# Patient Record
Sex: FEMALE | Race: WHITE | NOT HISPANIC OR LATINO
[De-identification: names, ages, dates, MRNs, and addresses within clinical notes are randomized per-mention and may not be internally consistent; named-entity substitution may affect disease eponyms.]

---

## 2017-02-17 ENCOUNTER — NON-APPOINTMENT (OUTPATIENT)
Age: 62
End: 2017-02-17

## 2017-02-17 ENCOUNTER — APPOINTMENT (OUTPATIENT)
Dept: CARDIOLOGY | Facility: CLINIC | Age: 62
End: 2017-02-17

## 2017-02-17 VITALS
HEART RATE: 70 BPM | WEIGHT: 128 LBS | BODY MASS INDEX: 21.85 KG/M2 | HEIGHT: 64 IN | DIASTOLIC BLOOD PRESSURE: 81 MMHG | SYSTOLIC BLOOD PRESSURE: 132 MMHG | OXYGEN SATURATION: 95 %

## 2017-02-17 RX ORDER — MULTIVIT-MIN/FOLIC/VIT K/LYCOP 400-300MCG
25 MCG TABLET ORAL DAILY
Qty: 90 | Refills: 1 | Status: ACTIVE | COMMUNITY

## 2017-05-08 ENCOUNTER — MEDICATION RENEWAL (OUTPATIENT)
Age: 62
End: 2017-05-08

## 2017-08-25 ENCOUNTER — APPOINTMENT (OUTPATIENT)
Dept: CARDIOLOGY | Facility: CLINIC | Age: 62
End: 2017-08-25
Payer: MEDICARE

## 2017-08-25 ENCOUNTER — NON-APPOINTMENT (OUTPATIENT)
Age: 62
End: 2017-08-25

## 2017-08-25 VITALS
BODY MASS INDEX: 22.36 KG/M2 | HEIGHT: 64 IN | WEIGHT: 131 LBS | SYSTOLIC BLOOD PRESSURE: 106 MMHG | HEART RATE: 69 BPM | OXYGEN SATURATION: 100 % | DIASTOLIC BLOOD PRESSURE: 70 MMHG

## 2017-08-25 PROCEDURE — 99214 OFFICE O/P EST MOD 30 MIN: CPT | Mod: 25

## 2017-08-25 PROCEDURE — 93000 ELECTROCARDIOGRAM COMPLETE: CPT

## 2017-10-25 ENCOUNTER — APPOINTMENT (OUTPATIENT)
Dept: CARDIOLOGY | Facility: CLINIC | Age: 62
End: 2017-10-25
Payer: MEDICARE

## 2017-10-25 PROCEDURE — 93306 TTE W/DOPPLER COMPLETE: CPT

## 2018-02-23 ENCOUNTER — APPOINTMENT (OUTPATIENT)
Dept: CARDIOLOGY | Facility: CLINIC | Age: 63
End: 2018-02-23
Payer: MEDICARE

## 2018-02-23 ENCOUNTER — NON-APPOINTMENT (OUTPATIENT)
Age: 63
End: 2018-02-23

## 2018-02-23 VITALS — HEIGHT: 64 IN | WEIGHT: 137 LBS | BODY MASS INDEX: 23.39 KG/M2

## 2018-02-23 VITALS — DIASTOLIC BLOOD PRESSURE: 80 MMHG | HEART RATE: 68 BPM | SYSTOLIC BLOOD PRESSURE: 124 MMHG | OXYGEN SATURATION: 97 %

## 2018-02-23 PROCEDURE — 93000 ELECTROCARDIOGRAM COMPLETE: CPT

## 2018-02-23 PROCEDURE — 99214 OFFICE O/P EST MOD 30 MIN: CPT | Mod: 25

## 2018-02-23 RX ORDER — PROPRANOLOL HYDROCHLORIDE 60 MG/1
60 CAPSULE, EXTENDED RELEASE ORAL
Qty: 1 | Refills: 1 | Status: DISCONTINUED | COMMUNITY
Start: 2018-02-23 | End: 2018-02-23

## 2018-02-23 RX ORDER — LOSARTAN POTASSIUM 50 MG/1
50 TABLET, FILM COATED ORAL DAILY
Qty: 90 | Refills: 3 | Status: DISCONTINUED | COMMUNITY
End: 2018-02-23

## 2018-05-01 ENCOUNTER — MEDICATION RENEWAL (OUTPATIENT)
Age: 63
End: 2018-05-01

## 2018-06-22 ENCOUNTER — APPOINTMENT (OUTPATIENT)
Dept: CARDIOLOGY | Facility: CLINIC | Age: 63
End: 2018-06-22
Payer: MEDICARE

## 2018-06-22 ENCOUNTER — NON-APPOINTMENT (OUTPATIENT)
Age: 63
End: 2018-06-22

## 2018-06-22 VITALS
DIASTOLIC BLOOD PRESSURE: 75 MMHG | HEIGHT: 64 IN | WEIGHT: 142 LBS | OXYGEN SATURATION: 99 % | BODY MASS INDEX: 24.24 KG/M2 | SYSTOLIC BLOOD PRESSURE: 119 MMHG | HEART RATE: 54 BPM

## 2018-06-22 PROCEDURE — 93000 ELECTROCARDIOGRAM COMPLETE: CPT

## 2018-06-22 PROCEDURE — 99214 OFFICE O/P EST MOD 30 MIN: CPT | Mod: 25

## 2019-01-31 ENCOUNTER — NON-APPOINTMENT (OUTPATIENT)
Age: 64
End: 2019-01-31

## 2019-01-31 ENCOUNTER — APPOINTMENT (OUTPATIENT)
Dept: CARDIOLOGY | Facility: CLINIC | Age: 64
End: 2019-01-31
Payer: MEDICARE

## 2019-01-31 VITALS — OXYGEN SATURATION: 99 % | HEART RATE: 68 BPM | WEIGHT: 150 LBS | HEIGHT: 64 IN | BODY MASS INDEX: 25.61 KG/M2

## 2019-01-31 VITALS — DIASTOLIC BLOOD PRESSURE: 77 MMHG | SYSTOLIC BLOOD PRESSURE: 135 MMHG

## 2019-01-31 PROCEDURE — 93000 ELECTROCARDIOGRAM COMPLETE: CPT

## 2019-01-31 PROCEDURE — 99214 OFFICE O/P EST MOD 30 MIN: CPT | Mod: 25

## 2019-01-31 NOTE — HISTORY OF PRESENT ILLNESS
[FreeTextEntry1] : This is 61 Y/O fe,stephanie PMH: Mentally challenged, seizure disorder, HTN, HLD, who presents today in routine follow up accompanied with formal caregiver who offers no concerns or complaints. \par \par Patient claims to be doing well no CP/SOB/Palpitations/Dizziness\par \par Patient was  seen neurologist , who recommended to switch losartan to  inderal LA  for essential tremors \par however patient  she did not tolerate inderal LA in the past ,  her tremors less severe \par \par Patient blood work 5/24/18    LDL 77 TG 40 \par Holter monitor showed sinus rhythm , with vpcs , bigeminal pattern,\par \par

## 2019-01-31 NOTE — REASON FOR VISIT
[Follow-Up - Clinic] : a clinic follow-up of [Abnormal ECG] : an abnormal ECG [Hyperlipidemia] : hyperlipidemia [Hypertension] : hypertension [Medication Management] : Medication management [FreeTextEntry1] : murmur,

## 2019-01-31 NOTE — DISCUSSION/SUMMARY
[ECG Normal Variant] : ECG normal variant [Non-specific ECG Changes] : abnormal ECG [Hyperlipidemia] : hyperlipidemia [Deteriorating] : deteriorating [Diet Modification] : diet modification [Exercise] : exercise [Hypertension] : hypertension [Stable] : stable [Exercise Regimen] : an exercise regimen [Sodium Restriction] : sodium restriction [Patient] : the patient [Compensated] : compensated [None] : none [de-identified] : mild to moderate MR

## 2019-01-31 NOTE — PHYSICAL EXAM
[General Appearance - Well Developed] : well developed [Normal Conjunctiva] : the conjunctiva exhibited no abnormalities [Normal Oral Mucosa] : normal oral mucosa [Normal Jugular Venous A Waves Present] : normal jugular venous A waves present [Normal Jugular Venous V Waves Present] : normal jugular venous V waves present [Respiration, Rhythm And Depth] : normal respiratory rhythm and effort [Exaggerated Use Of Accessory Muscles For Inspiration] : no accessory muscle use [Auscultation Breath Sounds / Voice Sounds] : lungs were clear to auscultation bilaterally [Chest Palpation] : palpation of the chest revealed no abnormalities [Lungs Percussion] : the lungs were normal to percussion [Bowel Sounds] : normal bowel sounds [Abnormal Walk] : normal gait [Nail Clubbing] : no clubbing of the fingernails [] : no ischemic changes [FreeTextEntry1] : No LE Edema [Skin Color & Pigmentation] : normal skin color and pigmentation [Skin Turgor] : normal skin turgor [Oriented To Time, Place, And Person] : oriented to person, place, and time [Affect] : the affect was normal [5th Left ICS - MCL] : palpated at the 5th LICS in the midclavicular line [Normal] : normal [Normal Rate] : normal [Normal S1] : normal S1 [Normal S2] : normal S2 [I] : a grade 1 [2+] : right 2+ [Bruit] : no bruit heard [No Pitting Edema] : no pitting edema present

## 2019-01-31 NOTE — REVIEW OF SYSTEMS
[Fever] : no fever [Chills] : no chills [Seeing Double (Diplopia)] : no diplopia [Eye Pain] : no eye pain [Eyeglasses] : currently wearing eyeglasses [Earache] : no earache [Shortness Of Breath] : no shortness of breath [Dyspnea on exertion] : not dyspnea during exertion [Chest  Pressure] : no chest pressure [Chest Pain] : no chest pain [Lower Ext Edema] : no extremity edema [Leg Claudication] : no intermittent leg claudication [Palpitations] : no palpitations [Cough] : no cough [Abdominal Pain] : no abdominal pain [Muscle Cramps] : no muscle cramps [see HPI] : see HPI [Negative] : Endocrine

## 2019-07-18 ENCOUNTER — APPOINTMENT (OUTPATIENT)
Dept: CARDIOLOGY | Facility: CLINIC | Age: 64
End: 2019-07-18
Payer: MEDICARE

## 2019-07-18 ENCOUNTER — NON-APPOINTMENT (OUTPATIENT)
Age: 64
End: 2019-07-18

## 2019-07-18 VITALS
HEART RATE: 55 BPM | HEIGHT: 64 IN | OXYGEN SATURATION: 99 % | BODY MASS INDEX: 24.41 KG/M2 | DIASTOLIC BLOOD PRESSURE: 56 MMHG | SYSTOLIC BLOOD PRESSURE: 110 MMHG | WEIGHT: 143 LBS

## 2019-07-18 PROCEDURE — 99214 OFFICE O/P EST MOD 30 MIN: CPT | Mod: 25

## 2019-07-18 PROCEDURE — 93000 ELECTROCARDIOGRAM COMPLETE: CPT

## 2019-07-18 NOTE — HISTORY OF PRESENT ILLNESS
[FreeTextEntry1] : This is 62 Y/O female PMH: Mentally challenged, seizure disorder, HTN, HLD, who presents today in routine follow up accompanied with formal caregiver who offers no concerns or complaints. \par \par Patient claims to be doing well no CP/SOB/Palpitations/Dizziness\par \par Patient was seen by neurologist who recommended switching losartan to  inderal LA  for essential tremors however,  patient was previously intolerant to this. Claims tremors she no longer has tremors.  \par

## 2019-07-18 NOTE — DISCUSSION/SUMMARY
[FreeTextEntry1] : MR: Mild-moderate by Echo will monitor with yearly surveillance studies.  Echo ordered today \par \par HTN: Controlled \par \par OV 6 months

## 2020-01-23 ENCOUNTER — APPOINTMENT (OUTPATIENT)
Dept: CARDIOLOGY | Facility: CLINIC | Age: 65
End: 2020-01-23
Payer: MEDICARE

## 2020-01-23 PROCEDURE — 93306 TTE W/DOPPLER COMPLETE: CPT

## 2020-01-24 ENCOUNTER — APPOINTMENT (OUTPATIENT)
Dept: CARDIOLOGY | Facility: CLINIC | Age: 65
End: 2020-01-24
Payer: MEDICARE

## 2020-01-24 ENCOUNTER — NON-APPOINTMENT (OUTPATIENT)
Age: 65
End: 2020-01-24

## 2020-01-24 VITALS
HEIGHT: 64 IN | SYSTOLIC BLOOD PRESSURE: 103 MMHG | BODY MASS INDEX: 23.56 KG/M2 | OXYGEN SATURATION: 98 % | DIASTOLIC BLOOD PRESSURE: 68 MMHG | WEIGHT: 138 LBS | HEART RATE: 63 BPM

## 2020-01-24 PROCEDURE — 99214 OFFICE O/P EST MOD 30 MIN: CPT

## 2020-01-24 PROCEDURE — 93000 ELECTROCARDIOGRAM COMPLETE: CPT

## 2020-01-24 NOTE — PHYSICAL EXAM
[General Appearance - Well Developed] : well developed [Normal Conjunctiva] : the conjunctiva exhibited no abnormalities [Normal Oral Mucosa] : normal oral mucosa [Normal Jugular Venous A Waves Present] : normal jugular venous A waves present [Normal Jugular Venous V Waves Present] : normal jugular venous V waves present [Respiration, Rhythm And Depth] : normal respiratory rhythm and effort [Exaggerated Use Of Accessory Muscles For Inspiration] : no accessory muscle use [Auscultation Breath Sounds / Voice Sounds] : lungs were clear to auscultation bilaterally [Chest Palpation] : palpation of the chest revealed no abnormalities [Lungs Percussion] : the lungs were normal to percussion [Bowel Sounds] : normal bowel sounds [Abnormal Walk] : normal gait [Nail Clubbing] : no clubbing of the fingernails [] : no ischemic changes [Skin Color & Pigmentation] : normal skin color and pigmentation [Skin Turgor] : normal skin turgor [Oriented To Time, Place, And Person] : oriented to person, place, and time [Affect] : the affect was normal [5th Left ICS - MCL] : palpated at the 5th LICS in the midclavicular line [Normal] : normal [Normal Rate] : normal [Normal S1] : normal S1 [Normal S2] : normal S2 [I] : a grade 1 [2+] : right 2+ [No Pitting Edema] : no pitting edema present [FreeTextEntry1] : No LE Edema [Bruit] : no bruit heard

## 2020-01-24 NOTE — HISTORY OF PRESENT ILLNESS
[FreeTextEntry1] : This is 63 Y/O fe,stephanie PMH: Mentally challenged, seizure disorder, HTN, HLD, who presents today for cardiac  follow up accompanied with formal caregiver who offers no concerns or complaints. \par \par Patient claims to be doing well no CP/SOB/Palpitations/Dizziness\par \par Patient was  seen neurologist , who recommended to switch losartan to  inderal LA  for essential tremors \par however patient  she did not tolerate inderal LA in the past , Claims to have no more tremors. \par \par Patient had recent blood work will get faxed as per caregiver.  \par \par

## 2020-01-24 NOTE — DISCUSSION/SUMMARY
[ECG Normal Variant] : ECG normal variant [Non-specific ECG Changes] : abnormal ECG [Hyperlipidemia] : hyperlipidemia [Deteriorating] : deteriorating [Diet Modification] : diet modification [Exercise] : exercise [Hypertension] : hypertension [Stable] : stable [Exercise Regimen] : an exercise regimen [Sodium Restriction] : sodium restriction [Patient] : the patient [Compensated] : compensated [None] : none [de-identified] : mild to moderate MR

## 2020-01-24 NOTE — REVIEW OF SYSTEMS
[Eyeglasses] : currently wearing eyeglasses [see HPI] : see HPI [Negative] : Endocrine [Fever] : no fever [Chills] : no chills [Seeing Double (Diplopia)] : no diplopia [Eye Pain] : no eye pain [Earache] : no earache [Shortness Of Breath] : no shortness of breath [Chest Pain] : no chest pain [Chest  Pressure] : no chest pressure [Dyspnea on exertion] : not dyspnea during exertion [Lower Ext Edema] : no extremity edema [Palpitations] : no palpitations [Leg Claudication] : no intermittent leg claudication [Abdominal Pain] : no abdominal pain [Cough] : no cough [Muscle Cramps] : no muscle cramps

## 2020-01-24 NOTE — CARDIOLOGY SUMMARY
[No Ischemia] : no Ischemia [___] : [unfilled] [LVEF ___%] : LVEF [unfilled]% [Mild] : mild LV dysfunction [None] : no pulmonary hypertension [Enlarged] : enlarged LA size

## 2020-09-04 ENCOUNTER — APPOINTMENT (OUTPATIENT)
Dept: CARDIOLOGY | Facility: CLINIC | Age: 65
End: 2020-09-04

## 2020-09-25 ENCOUNTER — NON-APPOINTMENT (OUTPATIENT)
Age: 65
End: 2020-09-25

## 2020-09-25 ENCOUNTER — APPOINTMENT (OUTPATIENT)
Dept: CARDIOLOGY | Facility: CLINIC | Age: 65
End: 2020-09-25
Payer: MEDICARE

## 2020-09-25 VITALS
OXYGEN SATURATION: 98 % | SYSTOLIC BLOOD PRESSURE: 126 MMHG | DIASTOLIC BLOOD PRESSURE: 73 MMHG | HEART RATE: 66 BPM | BODY MASS INDEX: 23.05 KG/M2 | HEIGHT: 64 IN | WEIGHT: 135 LBS

## 2020-09-25 PROCEDURE — 93000 ELECTROCARDIOGRAM COMPLETE: CPT

## 2020-09-25 PROCEDURE — 99214 OFFICE O/P EST MOD 30 MIN: CPT

## 2020-09-25 NOTE — REVIEW OF SYSTEMS
[Eyeglasses] : currently wearing eyeglasses [see HPI] : see HPI [Negative] : Endocrine [Fever] : no fever [Chills] : no chills [Seeing Double (Diplopia)] : no diplopia [Eye Pain] : no eye pain [Earache] : no earache [Mouth Sores] : no mouth sores [Shortness Of Breath] : no shortness of breath [Dyspnea on exertion] : not dyspnea during exertion [Chest  Pressure] : no chest pressure [Chest Pain] : no chest pain [Lower Ext Edema] : no extremity edema [Leg Claudication] : no intermittent leg claudication [Palpitations] : no palpitations [Cough] : no cough [Abdominal Pain] : no abdominal pain [Muscle Cramps] : no muscle cramps

## 2020-09-25 NOTE — CARDIOLOGY SUMMARY
[No Ischemia] : no Ischemia [___] : [unfilled] [LVEF ___%] : LVEF [unfilled]% [None] : no pulmonary hypertension [Enlarged] : enlarged LA size [Mild] : mild mitral regurgitation

## 2020-09-25 NOTE — DISCUSSION/SUMMARY
[ECG Normal Variant] : ECG normal variant [Non-specific ECG Changes] : abnormal ECG [Hyperlipidemia] : hyperlipidemia [Deteriorating] : deteriorating [Diet Modification] : diet modification [Exercise] : exercise [Hypertension] : hypertension [Stable] : stable [Exercise Regimen] : an exercise regimen [Sodium Restriction] : sodium restriction [Patient] : the patient [Compensated] : compensated [None] : none [de-identified] : mild to moderate MR

## 2020-09-25 NOTE — PHYSICAL EXAM
[General Appearance - Well Developed] : well developed [Normal Conjunctiva] : the conjunctiva exhibited no abnormalities [Normal Oral Mucosa] : normal oral mucosa [Normal Jugular Venous A Waves Present] : normal jugular venous A waves present [Normal Jugular Venous V Waves Present] : normal jugular venous V waves present [Respiration, Rhythm And Depth] : normal respiratory rhythm and effort [Exaggerated Use Of Accessory Muscles For Inspiration] : no accessory muscle use [Auscultation Breath Sounds / Voice Sounds] : lungs were clear to auscultation bilaterally [Chest Palpation] : palpation of the chest revealed no abnormalities [Lungs Percussion] : the lungs were normal to percussion [Bowel Sounds] : normal bowel sounds [Abnormal Walk] : normal gait [Nail Clubbing] : no clubbing of the fingernails [] : no ischemic changes [Skin Color & Pigmentation] : normal skin color and pigmentation [Skin Turgor] : normal skin turgor [Oriented To Time, Place, And Person] : oriented to person, place, and time [Affect] : the affect was normal [5th Left ICS - MCL] : palpated at the 5th LICS in the midclavicular line [Normal] : normal [Normal Rate] : normal [Normal S1] : normal S1 [Normal S2] : normal S2 [II] : a grade 2 [2+] : right 2+ [No Pitting Edema] : no pitting edema present [FreeTextEntry1] : No LE Edema [Bruit] : no bruit heard

## 2020-09-25 NOTE — HISTORY OF PRESENT ILLNESS
[FreeTextEntry1] : Patient with hx of Mentally challenged, seizure disorder, HTN, HLD, who presents today for cardiac  follow up accompanied with formal caregiver who offers no complains  \par \par Patient claims to be doing well no CP/SOB/Palpitations/Dizziness\par \par Patient was  seen neurologist , who recommended to switch losartan to  inderal LA  for essential tremors \par however patient  she did not tolerate inderal LA in the past , Claims to have no more tremors. \par \par Patient had recent blood work will get faxed as per caregiver.  \par \par

## 2020-09-28 LAB
ALBUMIN SERPL ELPH-MCNC: 4.6 G/DL
ALP BLD-CCNC: 77 U/L
ALT SERPL-CCNC: 11 U/L
ANION GAP SERPL CALC-SCNC: 15 MMOL/L
AST SERPL-CCNC: 15 U/L
BASOPHILS # BLD AUTO: 0.05 K/UL
BASOPHILS NFR BLD AUTO: 0.8 %
BILIRUB SERPL-MCNC: 0.3 MG/DL
BUN SERPL-MCNC: 12 MG/DL
CALCIUM SERPL-MCNC: 9.7 MG/DL
CHLORIDE SERPL-SCNC: 97 MMOL/L
CHOLEST SERPL-MCNC: 194 MG/DL
CHOLEST/HDLC SERPL: 1.8 RATIO
CO2 SERPL-SCNC: 26 MMOL/L
CREAT SERPL-MCNC: 0.79 MG/DL
EOSINOPHIL # BLD AUTO: 0.05 K/UL
EOSINOPHIL NFR BLD AUTO: 0.8 %
ESTIMATED AVERAGE GLUCOSE: 114 MG/DL
GLUCOSE SERPL-MCNC: 96 MG/DL
HBA1C MFR BLD HPLC: 5.6 %
HCT VFR BLD CALC: 38.7 %
HDLC SERPL-MCNC: 107 MG/DL
HGB BLD-MCNC: 12.6 G/DL
IMM GRANULOCYTES NFR BLD AUTO: 0.3 %
LDLC SERPL CALC-MCNC: 78 MG/DL
LYMPHOCYTES # BLD AUTO: 2.35 K/UL
LYMPHOCYTES NFR BLD AUTO: 36.8 %
MAN DIFF?: NORMAL
MCHC RBC-ENTMCNC: 30.4 PG
MCHC RBC-ENTMCNC: 32.6 GM/DL
MCV RBC AUTO: 93.5 FL
MONOCYTES # BLD AUTO: 0.56 K/UL
MONOCYTES NFR BLD AUTO: 8.8 %
NEUTROPHILS # BLD AUTO: 3.36 K/UL
NEUTROPHILS NFR BLD AUTO: 52.5 %
PLATELET # BLD AUTO: 233 K/UL
POTASSIUM SERPL-SCNC: 4.6 MMOL/L
PROT SERPL-MCNC: 6.8 G/DL
RBC # BLD: 4.14 M/UL
RBC # FLD: 13.3 %
SODIUM SERPL-SCNC: 137 MMOL/L
TRIGL SERPL-MCNC: 47 MG/DL
TSH SERPL-ACNC: 1.48 UIU/ML
WBC # FLD AUTO: 6.39 K/UL

## 2021-04-09 ENCOUNTER — APPOINTMENT (OUTPATIENT)
Dept: CARDIOLOGY | Facility: CLINIC | Age: 66
End: 2021-04-09
Payer: MEDICARE

## 2021-04-09 ENCOUNTER — NON-APPOINTMENT (OUTPATIENT)
Age: 66
End: 2021-04-09

## 2021-04-09 VITALS
HEIGHT: 64 IN | WEIGHT: 136 LBS | BODY MASS INDEX: 23.22 KG/M2 | OXYGEN SATURATION: 99 % | SYSTOLIC BLOOD PRESSURE: 124 MMHG | DIASTOLIC BLOOD PRESSURE: 76 MMHG | TEMPERATURE: 97.7 F | HEART RATE: 68 BPM

## 2021-04-09 PROCEDURE — 99214 OFFICE O/P EST MOD 30 MIN: CPT

## 2021-04-09 PROCEDURE — 93000 ELECTROCARDIOGRAM COMPLETE: CPT

## 2021-04-09 NOTE — REVIEW OF SYSTEMS
[Fever] : no fever [Chills] : no chills [Seeing Double (Diplopia)] : no diplopia [Eye Pain] : no eye pain [Eyeglasses] : currently wearing eyeglasses [Earache] : no earache [Mouth Sores] : no mouth sores [Shortness Of Breath] : no shortness of breath [Dyspnea on exertion] : not dyspnea during exertion [Chest  Pressure] : no chest pressure [Chest Pain] : no chest pain [Lower Ext Edema] : no extremity edema [Leg Claudication] : no intermittent leg claudication [Palpitations] : no palpitations [Cough] : no cough [Abdominal Pain] : no abdominal pain [Muscle Cramps] : no muscle cramps [see HPI] : see HPI [Negative] : Endocrine

## 2021-04-09 NOTE — HISTORY OF PRESENT ILLNESS
[FreeTextEntry1] : Patient with hx of Mentally challenged, seizure disorder, HTN, HLD, who presents today for cardiac  follow up accompanied with formal caregiver who offers no complains  \par \par Patient claims to be doing well no CP/SOB/Palpitations/Dizziness\par \par Patient was  seen neurologist , who recommended to switch losartan to  inderal LA  for essential tremors \par however patient  she did not tolerate inderal LA in the past , Claims to have no more tremors. \par \par Patient had recent blood work showed mild elevated cholesterol , with very high   her blood pressure is controlled \par \par Patient echo showed mild dilated ascending aorta 4 cm stable \par

## 2021-04-09 NOTE — PHYSICAL EXAM
[General Appearance - Well Developed] : well developed [Normal Conjunctiva] : the conjunctiva exhibited no abnormalities [Normal Oral Mucosa] : normal oral mucosa [Normal Jugular Venous A Waves Present] : normal jugular venous A waves present [Normal Jugular Venous V Waves Present] : normal jugular venous V waves present [Respiration, Rhythm And Depth] : normal respiratory rhythm and effort [Exaggerated Use Of Accessory Muscles For Inspiration] : no accessory muscle use [Auscultation Breath Sounds / Voice Sounds] : lungs were clear to auscultation bilaterally [Chest Palpation] : palpation of the chest revealed no abnormalities [Lungs Percussion] : the lungs were normal to percussion [Bowel Sounds] : normal bowel sounds [Abnormal Walk] : normal gait [Nail Clubbing] : no clubbing of the fingernails [] : no ischemic changes [FreeTextEntry1] : No LE Edema [Skin Color & Pigmentation] : normal skin color and pigmentation [Skin Turgor] : normal skin turgor [Oriented To Time, Place, And Person] : oriented to person, place, and time [Affect] : the affect was normal [5th Left ICS - MCL] : palpated at the 5th LICS in the midclavicular line [Normal] : normal [Normal Rate] : normal [Normal S1] : normal S1 [Normal S2] : normal S2 [II] : a grade 2 [2+] : right 2+ [Bruit] : no bruit heard [No Pitting Edema] : no pitting edema present

## 2021-04-09 NOTE — DISCUSSION/SUMMARY
[ECG Normal Variant] : ECG normal variant [Non-specific ECG Changes] : abnormal ECG [Hyperlipidemia] : hyperlipidemia [Deteriorating] : deteriorating [Diet Modification] : diet modification [Exercise] : exercise [Hypertension] : hypertension [Stable] : stable [Exercise Regimen] : an exercise regimen [Sodium Restriction] : sodium restriction [Patient] : the patient [Compensated] : compensated [None] : none [de-identified] : mild to moderate MR

## 2021-10-13 ENCOUNTER — APPOINTMENT (OUTPATIENT)
Dept: OTOLARYNGOLOGY | Facility: CLINIC | Age: 66
End: 2021-10-13

## 2021-10-14 ENCOUNTER — APPOINTMENT (OUTPATIENT)
Dept: CARDIOLOGY | Facility: CLINIC | Age: 66
End: 2021-10-14
Payer: MEDICARE

## 2021-10-14 ENCOUNTER — NON-APPOINTMENT (OUTPATIENT)
Age: 66
End: 2021-10-14

## 2021-10-14 VITALS
OXYGEN SATURATION: 98 % | HEART RATE: 63 BPM | HEIGHT: 64 IN | WEIGHT: 132 LBS | BODY MASS INDEX: 22.53 KG/M2 | DIASTOLIC BLOOD PRESSURE: 76 MMHG | SYSTOLIC BLOOD PRESSURE: 120 MMHG

## 2021-10-14 PROCEDURE — 99214 OFFICE O/P EST MOD 30 MIN: CPT

## 2021-10-14 PROCEDURE — 93000 ELECTROCARDIOGRAM COMPLETE: CPT

## 2021-10-14 NOTE — CARDIOLOGY SUMMARY
[de-identified] :  10/14/21 normal sinus rhythm early repolarization  T inversion v1v2   similar to prior ekg  [de-identified] : 1/23/20   EF 60% mild DD , mild dilated ascending aorta 4.0 cm  mild MR

## 2021-10-14 NOTE — HISTORY OF PRESENT ILLNESS
[FreeTextEntry1] : Patient with hx of Mentally challenged, seizure disorder, HTN, HLD, who presents today for cardiac  follow up accompanied with formal caregiver who offers no complains  ,  patient was  hospitalized  last month seizure /vertigo like symptoms  improved with meclizine , \par \par Patient claims to be doing well no CP/SOB/Palpitations/Dizziness\par \par Patient was  seen neurologist , who recommended to switch losartan to  inderal LA  for essential tremors \par however patient  she did not tolerate inderal LA in the past , Claims to have no more tremors. \par \par Patient had recent blood work showed mild elevated cholesterol , with very high   her blood pressure is controlled \par \par Patient echo showed mild dilated ascending aorta 4 cm stable \par

## 2021-10-14 NOTE — ASSESSMENT
[FreeTextEntry1] : Patient with above hx \par \par Abnormal EKG possible normal variant EKG  normal ventricular function without exertional chest pain , continue to monitor \par \par cardiac murmur  possible due to aortic sclerosis , mild MR   \par \par Mild dilated Ascending aorta , blood pressure control ,monitor aortic size with periodic echo   echo prior to next visit \par \par HTN  controlled   continue diet restrictions and medication\par \par dyslipidemia   elevated TC low HDL  continue follow diet restriction ,  \par \par follow up after 6 months

## 2021-10-14 NOTE — PHYSICAL EXAM
[Well Developed] : well developed [Well Nourished] : well nourished [No Acute Distress] : no acute distress [Normal Conjunctiva] : normal conjunctiva [Normal Venous Pressure] : normal venous pressure [No Carotid Bruit] : no carotid bruit [No Gallop] : no gallop [Normal Rate] : normal [Normal S1] : normal S1 [Normal S2] : normal S2 [II] : a grade 2 [No Pitting Edema] : no pitting edema present [2+] : left 2+ [No Abnormalities] : the abdominal aorta was not enlarged and no bruit was heard [Clear Lung Fields] : clear lung fields [Good Air Entry] : good air entry [No Respiratory Distress] : no respiratory distress  [Soft] : abdomen soft [Non Tender] : non-tender [Normal Bowel Sounds] : normal bowel sounds [Normal Gait] : normal gait [No Edema] : no edema [No Cyanosis] : no cyanosis [No Clubbing] : no clubbing [No Varicosities] : no varicosities [No Rash] : no rash [No Skin Lesions] : no skin lesions [Moves all extremities] : moves all extremities [No Focal Deficits] : no focal deficits [Normal Speech] : normal speech [Alert and Oriented] : alert and oriented [Normal memory] : normal memory [S3] : no S3 [S4] : no S4 [Right Carotid Bruit] : no bruit heard over the right carotid [Left Carotid Bruit] : no bruit heard over the left carotid [Right Femoral Bruit] : no bruit heard over the right femoral artery [Left Femoral Bruit] : no bruit heard over the left femoral artery

## 2021-10-15 ENCOUNTER — APPOINTMENT (OUTPATIENT)
Dept: CARDIOLOGY | Facility: CLINIC | Age: 66
End: 2021-10-15

## 2021-11-04 ENCOUNTER — APPOINTMENT (OUTPATIENT)
Dept: OTOLARYNGOLOGY | Facility: CLINIC | Age: 66
End: 2021-11-04
Payer: MEDICARE

## 2021-11-04 VITALS
HEART RATE: 68 BPM | WEIGHT: 132 LBS | SYSTOLIC BLOOD PRESSURE: 135 MMHG | TEMPERATURE: 97.6 F | BODY MASS INDEX: 22.53 KG/M2 | HEIGHT: 64 IN | DIASTOLIC BLOOD PRESSURE: 80 MMHG

## 2021-11-04 DIAGNOSIS — H90.3 SENSORINEURAL HEARING LOSS, BILATERAL: ICD-10-CM

## 2021-11-04 DIAGNOSIS — H81.09 MENIERE'S DISEASE, UNSPECIFIED EAR: ICD-10-CM

## 2021-11-04 PROCEDURE — 99204 OFFICE O/P NEW MOD 45 MIN: CPT

## 2021-11-04 NOTE — PHYSICAL EXAM
[Midline] : trachea located in midline position [Normal] : no rashes [Nystagmus] : ~T no ~M nystagmus was seen [Fukuda Step Test] : Fukuda Step Test was Negative [de-identified] : wnl

## 2021-11-04 NOTE — ASSESSMENT
[FreeTextEntry1] : Patient presents from group home with  complains she had a severe vertigo attack in August, was hospitalized for 2 days. She has vertigo for 2 years now. Gets an attack once a month \par \par f/o Vestibulopathy poss Meniere's Disease\par -Hearing Test performed to evaluate the extent of hearing loss and to explain pt's symptoms\par -ECOG/ABR ordered \par -Low salt diet handout given \par \par f/u prn and in 2 months

## 2021-11-12 ENCOUNTER — APPOINTMENT (OUTPATIENT)
Dept: CARDIOLOGY | Facility: CLINIC | Age: 66
End: 2021-11-12
Payer: MEDICARE

## 2021-11-12 PROCEDURE — 93306 TTE W/DOPPLER COMPLETE: CPT

## 2021-11-16 ENCOUNTER — NON-APPOINTMENT (OUTPATIENT)
Age: 66
End: 2021-11-16

## 2022-02-17 ENCOUNTER — APPOINTMENT (OUTPATIENT)
Dept: OTOLARYNGOLOGY | Facility: CLINIC | Age: 67
End: 2022-02-17

## 2022-04-07 ENCOUNTER — APPOINTMENT (OUTPATIENT)
Dept: CARDIOLOGY | Facility: CLINIC | Age: 67
End: 2022-04-07
Payer: MEDICARE

## 2022-04-07 ENCOUNTER — NON-APPOINTMENT (OUTPATIENT)
Age: 67
End: 2022-04-07

## 2022-04-07 VITALS
BODY MASS INDEX: 22.2 KG/M2 | HEIGHT: 64 IN | SYSTOLIC BLOOD PRESSURE: 132 MMHG | HEART RATE: 74 BPM | OXYGEN SATURATION: 99 % | WEIGHT: 130 LBS | DIASTOLIC BLOOD PRESSURE: 78 MMHG

## 2022-04-07 PROCEDURE — 99214 OFFICE O/P EST MOD 30 MIN: CPT

## 2022-04-07 PROCEDURE — 93000 ELECTROCARDIOGRAM COMPLETE: CPT

## 2022-04-07 NOTE — ASSESSMENT
[FreeTextEntry1] : Patient with above hx \par \par Abnormal EKG possible normal variant EKG  normal ventricular function without exertional chest pain , continue to monitor \par \par cardiac murmur  possible due to aortic sclerosis , mild MR   \par \par Mild dilated Ascending aorta  stable size , blood pressure control ,monitor aortic size with periodic echo  \par \par HTN  controlled   continue diet restrictions and medication losartan 50 mg po daily \par \par dyslipidemia   elevated TC low HDL  continue follow diet restriction ,  \par \par follow up after 6 months

## 2022-04-07 NOTE — CARDIOLOGY SUMMARY
[de-identified] : 4/7/22  normal sinus rhythm early repolarization  T inversion v1v2   similar to prior ekg  [de-identified] : 11/12/21  Mild LVH  EF 67% mild DD , mild dilated ascending aorta 3.8 cm  mild MR

## 2022-04-07 NOTE — HISTORY OF PRESENT ILLNESS
[FreeTextEntry1] : Patient with hx of Mentally challenged, seizure disorder, HTN, HLD, who presents today for cardiac  follow up accompanied with formal caregiver who offers no complains  ,\par \par Patient claims to be doing well no CP/SOB/Palpitations/Dizziness\par \par Patient was  seen neurologist , who recommended to switch losartan to  inderal LA  for essential tremors \par however patient  she did not tolerate inderal LA in the past , Claims to have no more tremors. \par \par Patient had recent blood work done march 2022  showed normal  cholesterol , with very high   her blood pressure is controlled  Hb a1c 5.7 \par \par Patient echo showed mild dilated ascending aorta 3.8  cm stable   patient blood pressure is controlled \par

## 2022-05-26 ENCOUNTER — APPOINTMENT (OUTPATIENT)
Dept: OTOLARYNGOLOGY | Facility: CLINIC | Age: 67
End: 2022-05-26
Payer: MEDICARE

## 2022-05-26 PROCEDURE — ZZZZZ: CPT

## 2022-05-26 PROCEDURE — 92584 ELECTROCOCHLEOGRAPHY: CPT

## 2022-05-26 PROCEDURE — 92653 AEP NEURODIAGNOSTIC I&R: CPT

## 2022-05-27 ENCOUNTER — NON-APPOINTMENT (OUTPATIENT)
Age: 67
End: 2022-05-27

## 2022-10-05 ENCOUNTER — RESULT CHARGE (OUTPATIENT)
Age: 67
End: 2022-10-05

## 2022-10-05 NOTE — REASON FOR VISIT
[Arrhythmia/ECG Abnorrmalities] : arrhythmia/ECG abnormalities [Hyperlipidemia] : hyperlipidemia [Hypertension] : hypertension [Formal Caregiver] : formal caregiver

## 2022-10-06 ENCOUNTER — NON-APPOINTMENT (OUTPATIENT)
Age: 67
End: 2022-10-06

## 2022-10-06 ENCOUNTER — APPOINTMENT (OUTPATIENT)
Dept: CARDIOLOGY | Facility: CLINIC | Age: 67
End: 2022-10-06

## 2022-10-06 VITALS
BODY MASS INDEX: 24.59 KG/M2 | HEART RATE: 67 BPM | SYSTOLIC BLOOD PRESSURE: 144 MMHG | OXYGEN SATURATION: 97 % | WEIGHT: 144 LBS | HEIGHT: 64 IN | DIASTOLIC BLOOD PRESSURE: 80 MMHG

## 2022-10-06 VITALS — DIASTOLIC BLOOD PRESSURE: 78 MMHG | SYSTOLIC BLOOD PRESSURE: 130 MMHG

## 2022-10-06 PROCEDURE — 93000 ELECTROCARDIOGRAM COMPLETE: CPT

## 2022-10-06 PROCEDURE — 99214 OFFICE O/P EST MOD 30 MIN: CPT

## 2022-10-06 RX ORDER — LEVETIRACETAM 500 MG/1
500 TABLET, FILM COATED ORAL TWICE DAILY
Refills: 0 | Status: ACTIVE | COMMUNITY
Start: 2022-10-06

## 2022-10-06 RX ORDER — LAMOTRIGINE 250 MG/1
250 TABLET, EXTENDED RELEASE ORAL DAILY
Refills: 0 | Status: ACTIVE | COMMUNITY
Start: 2022-10-06

## 2022-10-06 RX ORDER — MECLIZINE HYDROCHLORIDE 25 MG/1
25 TABLET ORAL 3 TIMES DAILY
Refills: 0 | Status: DISCONTINUED | COMMUNITY
End: 2022-10-06

## 2022-10-06 NOTE — PHYSICAL EXAM
[Well Developed] : well developed [Well Nourished] : well nourished [No Acute Distress] : no acute distress [Normal Conjunctiva] : normal conjunctiva [Normal Venous Pressure] : normal venous pressure [No Carotid Bruit] : no carotid bruit [No Gallop] : no gallop [Normal Rate] : normal [Normal S1] : normal S1 [Normal S2] : normal S2 [II] : a grade 2 [No Pitting Edema] : no pitting edema present [2+] : left 2+ [No Abnormalities] : the abdominal aorta was not enlarged and no bruit was heard [Clear Lung Fields] : clear lung fields [Good Air Entry] : good air entry [No Respiratory Distress] : no respiratory distress  [Soft] : abdomen soft [Non Tender] : non-tender [Normal Bowel Sounds] : normal bowel sounds [Normal Gait] : normal gait [No Edema] : no edema [No Cyanosis] : no cyanosis [No Clubbing] : no clubbing [No Varicosities] : no varicosities [No Rash] : no rash [No Skin Lesions] : no skin lesions [Moves all extremities] : moves all extremities [No Focal Deficits] : no focal deficits [Normal Speech] : normal speech [Alert and Oriented] : alert and oriented [Normal memory] : normal memory [Normal S1, S2] : normal S1, S2 [Murmur] : murmur [S3] : no S3 [S4] : no S4 [Right Carotid Bruit] : no bruit heard over the right carotid [Left Carotid Bruit] : no bruit heard over the left carotid [Right Femoral Bruit] : no bruit heard over the right femoral artery [Left Femoral Bruit] : no bruit heard over the left femoral artery

## 2022-10-06 NOTE — ASSESSMENT
[FreeTextEntry1] : Patient with above hx \par \par Abnormal EKG possible normal variant EKG  normal ventricular function without exertional chest pain , continue to monitor \par \par cardiac murmur possible due to aortic sclerosis , mild MR, will check echo \par \par Mild dilated Ascending aorta  stable size , blood pressure control ,monitor aortic size with periodic echo  \par \par HTN  controlled; pt with weight gain, encouraged regular aerobic exercise,   continue diet restrictions and medication losartan 50 mg po daily \par \par dyslipidemia   elevated TC low HDL  continue follow diet restriction ,  labs ordered\par \par follow up after 6 months with echo

## 2022-10-06 NOTE — CARDIOLOGY SUMMARY
[de-identified] : 10/6/22  normal sinus rhythm T inversion v1    [de-identified] : 11/12/21  Mild LVH  EF 67% mild DD , mild dilated ascending aorta 3.8 cm  mild MR

## 2022-10-06 NOTE — HISTORY OF PRESENT ILLNESS
[FreeTextEntry1] : Patient with hx of Mentally challenged, seizure disorder, HTN, HLD, who presents today for cardiac  follow up accompanied with formal caregiver who offers no complains  ,\par \par Patient claims to be doing well no CP/SOB/Palpitations/Dizziness\par \par Patient was  seen neurologist , who recommended to switch losartan to  inderal LA  for essential tremors \par however patient  not tolerate inderal LA in the past , Claims to have no more tremors. \par \par Patient had blood work done march 2022  showed normal  cholesterol , with very high   her blood pressure is controlled  Hb a1c 5.7 \par \par Patient echo showed mild dilated ascending aorta 3.8  cm stable   patient blood pressure is controlled \par

## 2022-10-06 NOTE — END OF VISIT
[FreeTextEntry3] : Patient was seen with NP agree with assessment and plan  [Time Spent: ___ minutes] : I have spent [unfilled] minutes of time on the encounter.

## 2023-04-13 ENCOUNTER — APPOINTMENT (OUTPATIENT)
Dept: CARDIOLOGY | Facility: CLINIC | Age: 68
End: 2023-04-13
Payer: MEDICARE

## 2023-04-13 ENCOUNTER — NON-APPOINTMENT (OUTPATIENT)
Age: 68
End: 2023-04-13

## 2023-04-13 VITALS
OXYGEN SATURATION: 95 % | HEIGHT: 64 IN | SYSTOLIC BLOOD PRESSURE: 140 MMHG | HEART RATE: 80 BPM | DIASTOLIC BLOOD PRESSURE: 76 MMHG | BODY MASS INDEX: 24.75 KG/M2 | WEIGHT: 145 LBS

## 2023-04-13 DIAGNOSIS — R94.31 ABNORMAL ELECTROCARDIOGRAM [ECG] [EKG]: ICD-10-CM

## 2023-04-13 PROCEDURE — 99214 OFFICE O/P EST MOD 30 MIN: CPT

## 2023-04-13 PROCEDURE — 93306 TTE W/DOPPLER COMPLETE: CPT

## 2023-04-13 PROCEDURE — 93000 ELECTROCARDIOGRAM COMPLETE: CPT

## 2023-04-13 RX ORDER — RALOXIFENE HYDROCHLORIDE 60 MG/1
60 TABLET ORAL DAILY
Refills: 0 | Status: DISCONTINUED | COMMUNITY
End: 2023-04-13

## 2023-04-13 RX ORDER — LOSARTAN POTASSIUM 50 MG/1
50 TABLET, FILM COATED ORAL DAILY
Qty: 90 | Refills: 1 | Status: DISCONTINUED | COMMUNITY
Start: 2018-02-23 | End: 2023-04-13

## 2023-04-13 NOTE — ASSESSMENT
[FreeTextEntry1] : Patient with above hx \par \par LVH with CHAN with significant left ventricular out flow tract obstruction : would discontinue losartan , start her on cardizem 180 mg po daily \par \par Abnormal EKG possible normal variant EKG  normal ventricular function without exertional chest pain , continue to monitor \par \par Mild dilated Ascending aorta  stable size , blood pressure control ,monitor aortic size with periodic echo  \par \par HTN  controlled; pt with weight gain, encouraged regular aerobic exercise,   continue diet restrictions and will discontinue losartan , start on cardizem as patient has LVOT obstruction , \par \par dyslipidemia   elevated TC low HDL  continue follow diet restriction ,  labs ordered\par \par follow up after 6 weeks

## 2023-04-13 NOTE — HISTORY OF PRESENT ILLNESS
[FreeTextEntry1] : Patient with hx of Mentally challenged, seizure disorder, HTN, HLD, who presents today for cardiac  follow up accompanied with formal caregiver who offers no complains  , patient had echocardiogram today showed LVH CHAN with moderate to severe left ventricular outflow tract obstruction . patient denies any dizzines \par \par Patient claims to be doing well no CP/SOB/Palpitations/Dizziness\par \par Patient was  seen neurologist , who recommended to switch losartan to  inderal LA  for essential tremors \par however patient  did not tolerate inderal LA in the past , Claims to have no more tremors. \par \par Patient had blood work done march 2022  showed normal  cholesterol , with very high   her blood pressure is controlled  Hb a1c 5.7 \par \par Patient echo showed mild dilated ascending aorta 3.8  cm stable   patient blood pressure is controlled \par

## 2023-04-13 NOTE — PHYSICAL EXAM
[Well Developed] : well developed [Well Nourished] : well nourished [No Acute Distress] : no acute distress [Normal Conjunctiva] : normal conjunctiva [Normal Venous Pressure] : normal venous pressure [No Carotid Bruit] : no carotid bruit [Normal S1, S2] : normal S1, S2 [No Gallop] : no gallop [Murmur] : murmur [Normal Rate] : normal [Normal S1] : normal S1 [Normal S2] : normal S2 [II] : a grade 2 [No Pitting Edema] : no pitting edema present [2+] : left 2+ [No Abnormalities] : the abdominal aorta was not enlarged and no bruit was heard [Clear Lung Fields] : clear lung fields [Good Air Entry] : good air entry [No Respiratory Distress] : no respiratory distress  [Soft] : abdomen soft [Non Tender] : non-tender [Normal Bowel Sounds] : normal bowel sounds [Normal Gait] : normal gait [No Edema] : no edema [No Cyanosis] : no cyanosis [No Clubbing] : no clubbing [No Varicosities] : no varicosities [No Rash] : no rash [No Skin Lesions] : no skin lesions [Moves all extremities] : moves all extremities [No Focal Deficits] : no focal deficits [Normal Speech] : normal speech [Alert and Oriented] : alert and oriented [Normal memory] : normal memory [S3] : no S3 [S4] : no S4 [Right Carotid Bruit] : no bruit heard over the right carotid [Left Carotid Bruit] : no bruit heard over the left carotid [Right Femoral Bruit] : no bruit heard over the right femoral artery [Left Femoral Bruit] : no bruit heard over the left femoral artery

## 2023-04-13 NOTE — CARDIOLOGY SUMMARY
[de-identified] : 10/6/22  normal sinus rhythm T inversion v1    [de-identified] : 11/12/21  Mild LVH   EF 67% mild DD , Left ventricular outflow tract obstruction  normal aortic valve opening ,  mild dilated ascending aorta 3.8 cm  mild MR

## 2023-05-26 ENCOUNTER — NON-APPOINTMENT (OUTPATIENT)
Age: 68
End: 2023-05-26

## 2023-05-26 ENCOUNTER — APPOINTMENT (OUTPATIENT)
Dept: CARDIOLOGY | Facility: CLINIC | Age: 68
End: 2023-05-26
Payer: MEDICARE

## 2023-05-26 VITALS
HEART RATE: 72 BPM | HEIGHT: 64 IN | WEIGHT: 146 LBS | OXYGEN SATURATION: 94 % | BODY MASS INDEX: 24.92 KG/M2 | DIASTOLIC BLOOD PRESSURE: 60 MMHG | SYSTOLIC BLOOD PRESSURE: 132 MMHG

## 2023-05-26 PROCEDURE — 93000 ELECTROCARDIOGRAM COMPLETE: CPT

## 2023-05-26 PROCEDURE — 99214 OFFICE O/P EST MOD 30 MIN: CPT

## 2023-05-26 RX ORDER — DILTIAZEM HYDROCHLORIDE 180 MG/1
180 CAPSULE, EXTENDED RELEASE ORAL
Qty: 90 | Refills: 0 | Status: DISCONTINUED | COMMUNITY
Start: 2023-04-13 | End: 2023-05-26

## 2023-05-26 RX ORDER — MECLIZINE HYDROCHLORIDE 25 MG/1
25 TABLET ORAL
Refills: 0 | Status: ACTIVE | COMMUNITY

## 2023-05-26 RX ORDER — LOSARTAN POTASSIUM 50 MG/1
50 TABLET, FILM COATED ORAL DAILY
Refills: 0 | Status: DISCONTINUED | COMMUNITY
End: 2023-05-26

## 2023-05-26 RX ORDER — ANTACID TABLETS 500 MG/1
500 TABLET, CHEWABLE ORAL TWICE DAILY
Refills: 0 | Status: ACTIVE | COMMUNITY

## 2023-05-26 RX ORDER — ATORVASTATIN CALCIUM 40 MG/1
40 TABLET, FILM COATED ORAL DAILY
Refills: 0 | Status: ACTIVE | COMMUNITY

## 2023-05-26 NOTE — CARDIOLOGY SUMMARY
[de-identified] : 5/26/23 sinus rhythm  [de-identified] : 4/13/23   Mild LVH   EF 67% mild DD , Left ventricular outflow tract obstruction  normal aortic valve opening ,  mild dilated ascending aorta 3.8 cm  mild to moderate MR  mild  TR

## 2023-05-26 NOTE — ASSESSMENT
[FreeTextEntry1] : Patient with above hx \par \par LVH with CHAN with significant left ventricular out flow tract obstruction : continue her on cardizem 180 mg po daily \par \par Abnormal EKG possible normal variant EKG  normal ventricular function without exertional chest pain , continue to monitor \par \par Mild dilated Ascending aorta  stable size , blood pressure control ,monitor aortic size with periodic echo  \par \par HTN  controlled; pt with weight gain, encouraged regular aerobic exercise,   continue diet restrictions and will discontinue losartan , start on cardizem as patient has LVOT obstruction , \par \par dyslipidemia   elevated TC low HDL  continue follow diet restriction , \par \par follow up after 3 months

## 2023-05-26 NOTE — HISTORY OF PRESENT ILLNESS
[FreeTextEntry1] : Patient with hx of Mentally challenged, seizure disorder, HTN, HLD, who presents today for cardiac  follow up accompanied with formal caregiver who offers no complains  , patient had echocardiogram today showed LVH CHAN with moderate to severe left ventricular outflow tract obstruction . patient was prescribed cardizem , losartan was discontinued   tolerating well , her blood pressure is controlled \par \par \par patient denies any dizziness but does take meclizine  as needed  \par \par Patient claims to be doing well no CP/SOB/Palpitations/Dizziness\par \par Patient was  seen neurologist , who recommended to switch losartan to  inderal LA  for essential tremors \par however patient  did not tolerate inderal LA in the past , Claims to have no more tremors. \par \par Patient had blood work done march 2023 showed normal  cholesterol , with very high   LDL 51 her blood pressure is controlled  Hb a1c 5.7 \par \par Patient echo showed mild dilated ascending aorta 3.8  cm stable   patient blood pressure is controlled \par

## 2023-08-31 ENCOUNTER — APPOINTMENT (OUTPATIENT)
Dept: CARDIOLOGY | Facility: CLINIC | Age: 68
End: 2023-08-31
Payer: MEDICARE

## 2023-08-31 ENCOUNTER — NON-APPOINTMENT (OUTPATIENT)
Age: 68
End: 2023-08-31

## 2023-08-31 VITALS
BODY MASS INDEX: 23.39 KG/M2 | WEIGHT: 137 LBS | TEMPERATURE: 98 F | DIASTOLIC BLOOD PRESSURE: 70 MMHG | OXYGEN SATURATION: 96 % | SYSTOLIC BLOOD PRESSURE: 120 MMHG | HEIGHT: 64 IN | HEART RATE: 73 BPM

## 2023-08-31 VITALS
HEART RATE: 73 BPM | OXYGEN SATURATION: 96 % | SYSTOLIC BLOOD PRESSURE: 142 MMHG | BODY MASS INDEX: 23.39 KG/M2 | DIASTOLIC BLOOD PRESSURE: 64 MMHG | WEIGHT: 137 LBS | HEIGHT: 64 IN

## 2023-08-31 VITALS — DIASTOLIC BLOOD PRESSURE: 70 MMHG | SYSTOLIC BLOOD PRESSURE: 130 MMHG

## 2023-08-31 DIAGNOSIS — I11.9 HYPERTENSIVE HEART DISEASE W/OUT HEART FAILURE: ICD-10-CM

## 2023-08-31 PROCEDURE — 93000 ELECTROCARDIOGRAM COMPLETE: CPT

## 2023-08-31 PROCEDURE — 99214 OFFICE O/P EST MOD 30 MIN: CPT

## 2023-08-31 NOTE — CARDIOLOGY SUMMARY
[de-identified] : 8/31/23  sinus rhythm  [de-identified] : 4/13/23   Mild LVH   EF 67% mild DD , Left ventricular outflow tract obstruction  normal aortic valve opening ,  mild dilated ascending aorta 3.8 cm  mild to moderate MR  mild  TR

## 2023-08-31 NOTE — ASSESSMENT
[FreeTextEntry1] : Patient with above hx   Dizziness episodes :  no immediate orthostatic   prior hx of vertigo was on meclizine , no evidence of immediate orthostasis , however patient does have LVOT obstruction ,    LVH with CHAN with significant left ventricular out flow tract obstruction :  will increase  cardizem to 240mg daily ,,discontinue  180 mg po daily   Abnormal EKG possible normal variant EKG  normal ventricular function without exertional chest pain , continue to monitor   Mild dilated Ascending aorta  stable size , blood pressure control ,monitor aortic size with periodic echo    HTN  controlled; pt with weight gain, encouraged regular aerobic exercise,   continue diet restrictions and will discontinue losartan , increase  cardizem dose as patient has LVOT obstruction ,   dyslipidemia   elevated TC low HDL  continue follow diet restriction ,   follow up after 3 months

## 2023-08-31 NOTE — PHYSICAL EXAM
[Well Developed] : well developed [Well Nourished] : well nourished [No Acute Distress] : no acute distress [Normal Conjunctiva] : normal conjunctiva [Normal Venous Pressure] : normal venous pressure [No Carotid Bruit] : no carotid bruit [Normal S1, S2] : normal S1, S2 [No Gallop] : no gallop [Murmur] : murmur [Normal Rate] : normal [Normal S1] : normal S1 [Normal S2] : normal S2 [S3] : no S3 [S4] : no S4 [II] : a grade 2 [No Pitting Edema] : no pitting edema present [Right Carotid Bruit] : no bruit heard over the right carotid [Left Carotid Bruit] : no bruit heard over the left carotid [Right Femoral Bruit] : no bruit heard over the right femoral artery [Left Femoral Bruit] : no bruit heard over the left femoral artery [2+] : left 2+ [No Abnormalities] : the abdominal aorta was not enlarged and no bruit was heard [Clear Lung Fields] : clear lung fields [Good Air Entry] : good air entry [No Respiratory Distress] : no respiratory distress  [Soft] : abdomen soft [Non Tender] : non-tender [Normal Bowel Sounds] : normal bowel sounds [Normal Gait] : normal gait [No Edema] : no edema [No Cyanosis] : no cyanosis [No Clubbing] : no clubbing [No Varicosities] : no varicosities [No Rash] : no rash [No Skin Lesions] : no skin lesions [Moves all extremities] : moves all extremities [No Focal Deficits] : no focal deficits [Normal Speech] : normal speech [Alert and Oriented] : alert and oriented [Normal memory] : normal memory

## 2023-08-31 NOTE — HISTORY OF PRESENT ILLNESS
[FreeTextEntry1] : Patient with hx of Mentally challenged, seizure disorder, HTN, HLD, who presents today for cardiac follow up accompanied with formal caregiver , patient says she does gets episodes of on and off , random onset , on getting  up . patient does have tremors ( shaking )for some time , patient does drink enough ,  patient  says she feels better after taking meclizine ,  BP supine 130 mm hg , standing 122 mm hg , patient denies any exertional dizziness ,   patient had echocardiogram today showed LVH CHAN with moderate to severe left ventricular outflow tract obstruction . patient was prescribed cardizem  Patient claims to be doing well no CP/SOB/Palpitations  Patient was  seen neurologist , who recommended to switch losartan to  inderal LA  for essential tremors  however patient  did not tolerate inderal LA in the past , Claims to have no more tremors.   Patient had blood work done march 2023 showed normal  cholesterol , with very high   LDL 51 her blood pressure is controlled  Hb a1c 5.7   Patient echo showed mild dilated ascending aorta 3.8  cm stable   patient blood pressure is controlled

## 2023-11-30 ENCOUNTER — APPOINTMENT (OUTPATIENT)
Dept: CARDIOLOGY | Facility: CLINIC | Age: 68
End: 2023-11-30

## 2024-01-12 ENCOUNTER — NON-APPOINTMENT (OUTPATIENT)
Age: 69
End: 2024-01-12

## 2024-01-12 ENCOUNTER — APPOINTMENT (OUTPATIENT)
Dept: CARDIOLOGY | Facility: CLINIC | Age: 69
End: 2024-01-12
Payer: MEDICARE

## 2024-01-12 VITALS
HEART RATE: 74 BPM | OXYGEN SATURATION: 96 % | HEIGHT: 60 IN | WEIGHT: 140 LBS | DIASTOLIC BLOOD PRESSURE: 60 MMHG | SYSTOLIC BLOOD PRESSURE: 128 MMHG | BODY MASS INDEX: 27.48 KG/M2

## 2024-01-12 DIAGNOSIS — G40.909 EPILEPSY, UNSPECIFIED, NOT INTRACTABLE, W/OUT STATUS EPILEPTICUS: ICD-10-CM

## 2024-01-12 DIAGNOSIS — R42 DIZZINESS AND GIDDINESS: ICD-10-CM

## 2024-01-12 PROCEDURE — 99214 OFFICE O/P EST MOD 30 MIN: CPT

## 2024-01-12 PROCEDURE — 93000 ELECTROCARDIOGRAM COMPLETE: CPT

## 2024-01-12 RX ORDER — CARBAMIDE PEROXIDE 6.50% KIT 6.5 G/100ML
LIQUID AURICULAR (OTIC)
Refills: 0 | Status: ACTIVE | COMMUNITY

## 2024-01-12 RX ORDER — ASPIRIN 81 MG
81 TABLET, DELAYED RELEASE (ENTERIC COATED) ORAL DAILY
Refills: 0 | Status: ACTIVE | COMMUNITY

## 2024-01-12 RX ORDER — ASPIRIN 325 MG/1
TABLET, FILM COATED ORAL DAILY
Refills: 0 | Status: DISCONTINUED | COMMUNITY
End: 2024-01-12

## 2024-01-12 NOTE — HISTORY OF PRESENT ILLNESS
[FreeTextEntry1] : Patient with hx of Mentally challenged, seizure disorder, HTN, HLD, who presents today for cardiac follow up accompanied with formal caregiver with complain that she had seizure 1/10/24 evening , while she was in the bed , after the event she was noted to have markedly elevated blood pressure 180/90 checked by manager , patient denies any chest pain or shortness of breath , patient was observed overnight in [facility] , patient hospital visit records reviewed , had normal EKG normal blood work  , blood pressure was normal without any intervention , since then she is feeling fine    , patient says she does gets episodes of on and off , random onset , on getting  up . patient does have tremors ( shaking )for some time , patient does drink enough ,  patient  says she feels better after taking meclizine ,  BP supine 130 mm hg , standing 122 mm hg , patient denies any exertional dizziness ,   patient had echocardiogram today showed LVH CHAN with moderate to severe left ventricular outflow tract obstruction . patient was prescribed cardizem  Patient was  seen neurologist , who recommended to switch losartan to  inderal LA  for essential tremors  however patient  did not tolerate inderal LA in the past , Claims to have no more tremors.   Patient had blood work done march 2023 showed normal  cholesterol , with very high   LDL 51 her blood pressure is controlled  Hb a1c 5.7   Patient echo showed mild dilated ascending aorta 3.8  cm stable

## 2024-01-12 NOTE — DISCUSSION/SUMMARY
[FreeTextEntry1] : Patient with above hx  s/p seizure , post ictal elevated blood pressure which was normalized after the event .   Dizziness episodes : no immediate orthostatic prior hx of vertigo was on meclizine , no evidence of immediate orthostasis , however patient does have LVOT obstruction ,  LVH with CHAN with significant left ventricular out flow tract obstruction: will continue  cardizem to 240mg daily ,  Abnormal EKG possible normal variant EKG normal ventricular function without exertional chest pain , continue to monitor  Mild dilated Ascending aorta stable size , blood pressure control ,monitor aortic size with periodic echo  HTN controlled; pt with weight gain, encouraged regular aerobic exercise, continue diet restrictions and will discontinue losartan , increase cardizem dose as patient has LVOT obstruction ,  dyslipidemia elevated TC low HDL continue follow diet restriction ,  follow up after 3 months. [EKG obtained to assist in diagnosis and management of assessed problem(s)] : EKG obtained to assist in diagnosis and management of assessed problem(s)

## 2024-01-12 NOTE — CARDIOLOGY SUMMARY
[de-identified] : 1/12/24sinus rhythm  [de-identified] : 4/13/23   Mild LVH   EF 67% mild DD , Left ventricular outflow tract obstruction  normal aortic valve opening ,  mild dilated ascending aorta 3.8 cm  mild to moderate MR  mild  TR

## 2024-03-18 RX ORDER — DILTIAZEM HYDROCHLORIDE 240 MG/1
240 CAPSULE, EXTENDED RELEASE ORAL
Qty: 90 | Refills: 2 | Status: ACTIVE | COMMUNITY
Start: 2023-05-26 | End: 1900-01-01

## 2024-04-18 ENCOUNTER — APPOINTMENT (OUTPATIENT)
Dept: CARDIOLOGY | Facility: CLINIC | Age: 69
End: 2024-04-18
Payer: MEDICARE

## 2024-04-18 VITALS — DIASTOLIC BLOOD PRESSURE: 64 MMHG | SYSTOLIC BLOOD PRESSURE: 114 MMHG

## 2024-04-18 VITALS
WEIGHT: 139 LBS | HEART RATE: 65 BPM | OXYGEN SATURATION: 94 % | BODY MASS INDEX: 27.15 KG/M2 | SYSTOLIC BLOOD PRESSURE: 122 MMHG | DIASTOLIC BLOOD PRESSURE: 68 MMHG

## 2024-04-18 VITALS — SYSTOLIC BLOOD PRESSURE: 128 MMHG | DIASTOLIC BLOOD PRESSURE: 68 MMHG

## 2024-04-18 DIAGNOSIS — R01.1 CARDIAC MURMUR, UNSPECIFIED: ICD-10-CM
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
## 2024-04-18 DIAGNOSIS — I77.810 THORACIC AORTIC ECTASIA: ICD-10-CM

## 2024-04-18 DIAGNOSIS — R42 DIZZINESS AND GIDDINESS: ICD-10-CM

## 2024-04-18 PROCEDURE — 93000 ELECTROCARDIOGRAM COMPLETE: CPT | Mod: 59

## 2024-04-18 PROCEDURE — G2211 COMPLEX E/M VISIT ADD ON: CPT

## 2024-04-18 PROCEDURE — 99215 OFFICE O/P EST HI 40 MIN: CPT

## 2024-04-18 NOTE — REASON FOR VISIT
[Arrhythmia/ECG Abnorrmalities] : arrhythmia/ECG abnormalities [Hyperlipidemia] : hyperlipidemia [Hypertension] : hypertension [Formal Caregiver] : formal caregiver [Symptom and Test Evaluation] : symptom and test evaluation

## 2024-04-19 NOTE — CARDIOLOGY SUMMARY
[de-identified] : 4/18/24 sinus rhythm  [de-identified] : 4/13/23   Mild LVH   EF 67% mild DD , Left ventricular outflow tract obstruction  normal aortic valve opening ,  mild dilated ascending aorta 3.8 cm  mild to moderate MR  mild  TR

## 2024-04-19 NOTE — DISCUSSION/SUMMARY
[EKG obtained to assist in diagnosis and management of assessed problem(s)] : EKG obtained to assist in diagnosis and management of assessed problem(s) [FreeTextEntry1] : Patient with above hx  s/p seizure , post ictal elevated blood pressure which was normalized after the event .   Dizziness episodes : no immediate orthostatic prior hx of vertigo was on meclizine , no evidence of immediate orthostasis, however patient does have LVOT obstruction, rule out arrhythmia,  will obtain 2 week monitor  to rule our arrhythmia  associated with her dizziness episodes , continue Cardizem   check blood pressure while she is having symptoms   for 2 weeks   LVH with CHAN with significant left ventricular out flow tract obstruction: will continue  cardizem  240mg daily , patients symptoms may be related to this , no evidence of orthostatic hypotension   Abnormal EKG possible normal variant EKG normal ventricular function without exertional chest pain , continue to monitor  Mild dilated Ascending aorta stable size , blood pressure control ,monitor aortic size with periodic echo  HTN controlled; pt with weight gain, encouraged regular aerobic exercise, continue diet restrictions and will increase cardizem dose as patient has LVOT obstruction ,  dyslipidemia elevated TC low HDL continue follow diet restriction ,  follow up after 6 weeks

## 2024-04-19 NOTE — PHYSICAL EXAM
[Well Developed] : well developed [Well Nourished] : well nourished [No Acute Distress] : no acute distress [Normal Conjunctiva] : normal conjunctiva [Normal Venous Pressure] : normal venous pressure [No Carotid Bruit] : no carotid bruit [Normal S1, S2] : normal S1, S2 [No Gallop] : no gallop [Murmur] : murmur [Normal Rate] : normal [Normal S1] : normal S1 [Normal S2] : normal S2 [II] : a grade 2 [No Pitting Edema] : no pitting edema present [2+] : left 2+ [No Abnormalities] : the abdominal aorta was not enlarged and no bruit was heard [Clear Lung Fields] : clear lung fields [Good Air Entry] : good air entry [No Respiratory Distress] : no respiratory distress  [Soft] : abdomen soft [Non Tender] : non-tender [Normal Bowel Sounds] : normal bowel sounds [Normal Gait] : normal gait [No Edema] : no edema [No Cyanosis] : no cyanosis [No Clubbing] : no clubbing [No Varicosities] : no varicosities [No Rash] : no rash [No Skin Lesions] : no skin lesions [Moves all extremities] : moves all extremities [No Focal Deficits] : no focal deficits [Normal Speech] : normal speech [Alert and Oriented] : alert and oriented [Cognitive Impairment] : cognitive impairment [S3] : no S3 [S4] : no S4 [Right Carotid Bruit] : no bruit heard over the right carotid [Left Carotid Bruit] : no bruit heard over the left carotid [Right Femoral Bruit] : no bruit heard over the right femoral artery [Left Femoral Bruit] : no bruit heard over the left femoral artery

## 2024-04-19 NOTE — HISTORY OF PRESENT ILLNESS
[FreeTextEntry1] : Patient with hx of Mentally challenged, seizure disorder, HTN, HLD  brought  in follow up  by caregiver  , Patient apparently fell , patient says she feels dizziness when she gets up in the morning , to go to the bathroom felt  shaky  tremors ,  one time she fell next to dresser ,  patient denies any other associated  symptoms denies any  LOC    had normal EKG normal blood work  , blood pressure was normal  since then she is feeling fine    patient says she does gets episodes of on and off dizziness , random onset , on getting  up . patient does have tremors ( shaking )for some time , patient does drink enough ,  patient  says she feels better after taking meclizine ,  BP supine 130 mm hg , standing 122 mm hg , patient denies any exertional dizziness ,   patient had echocardiogram today showed LVH CHAN with moderate to severe left ventricular outflow tract obstruction . patient was prescribed cardizem  Patient was  seen neurologist , who recommended to switch losartan to  inderal LA  for essential tremors  however patient  did not tolerate inderal LA in the past , Claims to have no more tremors.   Patient had blood work done march 2023 showed normal  cholesterol , with very high   LDL 51 her blood pressure is controlled  Hb a1c 5.7   Patient echo showed mild dilated ascending aorta 3.8  cm stable

## 2024-04-26 ENCOUNTER — APPOINTMENT (OUTPATIENT)
Dept: CARDIOLOGY | Facility: CLINIC | Age: 69
End: 2024-04-26
Payer: MEDICARE

## 2024-04-26 PROCEDURE — 93306 TTE W/DOPPLER COMPLETE: CPT

## 2024-05-31 ENCOUNTER — APPOINTMENT (OUTPATIENT)
Dept: CARDIOLOGY | Facility: CLINIC | Age: 69
End: 2024-05-31
Payer: MEDICARE

## 2024-05-31 ENCOUNTER — NON-APPOINTMENT (OUTPATIENT)
Age: 69
End: 2024-05-31

## 2024-05-31 VITALS
WEIGHT: 137 LBS | HEART RATE: 67 BPM | SYSTOLIC BLOOD PRESSURE: 120 MMHG | HEIGHT: 60 IN | OXYGEN SATURATION: 96 % | BODY MASS INDEX: 26.9 KG/M2 | DIASTOLIC BLOOD PRESSURE: 70 MMHG

## 2024-05-31 DIAGNOSIS — I34.0 NONRHEUMATIC MITRAL (VALVE) INSUFFICIENCY: ICD-10-CM

## 2024-05-31 DIAGNOSIS — E78.5 HYPERLIPIDEMIA, UNSPECIFIED: ICD-10-CM

## 2024-05-31 DIAGNOSIS — I10 ESSENTIAL (PRIMARY) HYPERTENSION: ICD-10-CM

## 2024-05-31 DIAGNOSIS — I42.2 OTHER HYPERTROPHIC CARDIOMYOPATHY: ICD-10-CM

## 2024-05-31 DIAGNOSIS — Q24.8 OTHER SPECIFIED CONGENITAL MALFORMATIONS OF HEART: ICD-10-CM

## 2024-05-31 PROCEDURE — 99214 OFFICE O/P EST MOD 30 MIN: CPT

## 2024-05-31 PROCEDURE — G2211 COMPLEX E/M VISIT ADD ON: CPT

## 2024-05-31 PROCEDURE — 93000 ELECTROCARDIOGRAM COMPLETE: CPT

## 2024-05-31 NOTE — PHYSICAL EXAM
[Well Developed] : well developed [Well Nourished] : well nourished [No Acute Distress] : no acute distress [Normal Conjunctiva] : normal conjunctiva [Normal Venous Pressure] : normal venous pressure [No Carotid Bruit] : no carotid bruit [Normal S1, S2] : normal S1, S2 [No Gallop] : no gallop [Murmur] : murmur [Normal Rate] : normal [Normal S1] : normal S1 [Normal S2] : normal S2 [S3] : no S3 [S4] : no S4 [II] : a grade 2 [No Pitting Edema] : no pitting edema present [Right Carotid Bruit] : no bruit heard over the right carotid [Left Carotid Bruit] : no bruit heard over the left carotid [Right Femoral Bruit] : no bruit heard over the right femoral artery [Left Femoral Bruit] : no bruit heard over the left femoral artery [2+] : left 2+ [No Abnormalities] : the abdominal aorta was not enlarged and no bruit was heard [Clear Lung Fields] : clear lung fields [Good Air Entry] : good air entry [No Respiratory Distress] : no respiratory distress  [Soft] : abdomen soft [Non Tender] : non-tender [Normal Bowel Sounds] : normal bowel sounds [Normal Gait] : normal gait [No Edema] : no edema [No Cyanosis] : no cyanosis [No Clubbing] : no clubbing [No Varicosities] : no varicosities [No Rash] : no rash [No Skin Lesions] : no skin lesions [Moves all extremities] : moves all extremities [No Focal Deficits] : no focal deficits [Normal Speech] : normal speech [Alert and Oriented] : alert and oriented [Cognitive Impairment] : cognitive impairment

## 2024-05-31 NOTE — DISCUSSION/SUMMARY
[FreeTextEntry1] : Patient with above hx  s/p seizure , post ictal elevated blood pressure which was normalized after the event .   Dizziness episodes :  no recurrence , no immediate orthostatic prior hx of vertigo was on meclizine , no evidence of immediate orthostasis, however patient does have LVOT obstruction, rule out arrhythmia,  no significant arrhythmia on monitor  continue Cardizem   LVH with CHAN with significant left ventricular out flow tract obstruction: will continue  cardizem  240mg daily , patients symptoms may be related to this  currently stable  , no evidence of orthostatic hypotension   Abnormal EKG possible normal variant EKG normal ventricular function without exertional chest pain , continue to monitor  Mild dilated Ascending aorta stable size , blood pressure control ,monitor aortic size with periodic echo  HTN controlled; pt with weight gain, encouraged regular aerobic exercise, continue diet restrictions and will continue  cardizem dose as patient has LVOT obstruction ,  dyslipidemia elevated TC low HDL continue follow diet restriction ,  follow up after 6 weeks  [EKG obtained to assist in diagnosis and management of assessed problem(s)] : EKG obtained to assist in diagnosis and management of assessed problem(s)

## 2024-05-31 NOTE — HISTORY OF PRESENT ILLNESS
[FreeTextEntry1] : Patient with hx of Mentally challenged, seizure disorder, HTN, HLD  brought  in follow up  by caregiver  says she is feeling fine , no recurrence of fall , patient had monitor sinus rhythm without significant bradycardia , did have brief SVT  , on current medication   had normal EKG normal blood work  , blood pressure was normal  since then she is feeling fine . Patient says she is not feeling any more dizziness ,  or palpitation ,   patient had echocardiogram done 4/26/24  showed LVH CHAN with moderate to severe left ventricular outflow tract obstruction . patient was prescribed cardizem which she is tolerating well , blood pressure is normal range   Patient was  seen neurologist , who recommended to switch losartan to  inderal LA  for essential tremors  however patient  did not tolerate inderal LA in the past , Claims to have no more tremors.   Patient had blood work done april 18 th 2024  showed normal  cholesterol , with very high HDL 93 LDL 44 her blood pressure is controlled  Hb a1c 5.7   Patient echo showed mild dilated ascending aorta 3.8  cm stable

## 2024-05-31 NOTE — CARDIOLOGY SUMMARY
[de-identified] : 5/31/24 normal sinus rhythm  [de-identified] : 4/26/24   severe LVH, relative asymmetric septal hypertrophy    EF 67% mild DD ,severe Left ventricular outflow tract obstruction  58 mm hg  normal aortic valve opening ,  mild dilated ascending aorta 3.8 cm  mild to moderate MR  mild  TR

## 2024-05-31 NOTE — REASON FOR VISIT
[Symptom and Test Evaluation] : symptom and test evaluation [Arrhythmia/ECG Abnorrmalities] : arrhythmia/ECG abnormalities [Hyperlipidemia] : hyperlipidemia [Hypertension] : hypertension [Formal Caregiver] : formal caregiver

## 2024-08-30 ENCOUNTER — APPOINTMENT (OUTPATIENT)
Dept: CARDIOLOGY | Facility: CLINIC | Age: 69
End: 2024-08-30
Payer: MEDICARE

## 2024-08-30 ENCOUNTER — NON-APPOINTMENT (OUTPATIENT)
Age: 69
End: 2024-08-30

## 2024-08-30 VITALS
WEIGHT: 142 LBS | HEIGHT: 60 IN | OXYGEN SATURATION: 94 % | RESPIRATION RATE: 14 BRPM | BODY MASS INDEX: 27.88 KG/M2 | TEMPERATURE: 98 F | SYSTOLIC BLOOD PRESSURE: 128 MMHG | DIASTOLIC BLOOD PRESSURE: 60 MMHG | HEART RATE: 68 BPM

## 2024-08-30 VITALS
HEIGHT: 60 IN | HEART RATE: 68 BPM | DIASTOLIC BLOOD PRESSURE: 58 MMHG | BODY MASS INDEX: 27.88 KG/M2 | WEIGHT: 142 LBS | SYSTOLIC BLOOD PRESSURE: 128 MMHG | OXYGEN SATURATION: 94 %

## 2024-08-30 DIAGNOSIS — I10 ESSENTIAL (PRIMARY) HYPERTENSION: ICD-10-CM

## 2024-08-30 DIAGNOSIS — I51.7 CARDIOMEGALY: ICD-10-CM

## 2024-08-30 DIAGNOSIS — E78.5 HYPERLIPIDEMIA, UNSPECIFIED: ICD-10-CM

## 2024-08-30 DIAGNOSIS — G40.909 EPILEPSY, UNSPECIFIED, NOT INTRACTABLE, W/OUT STATUS EPILEPTICUS: ICD-10-CM

## 2024-08-30 DIAGNOSIS — I77.810 THORACIC AORTIC ECTASIA: ICD-10-CM

## 2024-08-30 DIAGNOSIS — I11.9 HYPERTENSIVE HEART DISEASE W/OUT HEART FAILURE: ICD-10-CM

## 2024-08-30 DIAGNOSIS — I34.0 NONRHEUMATIC MITRAL (VALVE) INSUFFICIENCY: ICD-10-CM

## 2024-08-30 PROCEDURE — 93000 ELECTROCARDIOGRAM COMPLETE: CPT

## 2024-08-30 PROCEDURE — G2211 COMPLEX E/M VISIT ADD ON: CPT

## 2024-08-30 PROCEDURE — 99214 OFFICE O/P EST MOD 30 MIN: CPT

## 2024-08-30 NOTE — DISCUSSION/SUMMARY
[FreeTextEntry1] : Patient with above hx  s/p seizure , post ictal elevated blood pressure which was normalized after the event .   Dizziness episodes :  no recurrence , no immediate orthostatic prior hx of vertigo was on meclizine , no evidence of immediate orthostasis, however patient does have LVOT obstruction,   no significant arrhythmia on monitor  continue Cardizem   LVH with CHAN with significant left ventricular out flow tract obstruction: will continue  cardizem  240mg daily , patients symptoms may be related to this  currently stable  , no evidence of orthostatic hypotension   Abnormal EKG possible normal variant EKG normal ventricular function without exertional chest pain , continue to monitor  Mild dilated Ascending aorta stable size , blood pressure control ,monitor aortic size with periodic echo  HTN controlled; pt with weight gain, encouraged regular aerobic exercise, continue diet restrictions and will continue  cardizem dose as patient has LVOT obstruction ,  dyslipidemia elevated TC low HDL continue follow diet restriction ,  follow up after  3 months  [EKG obtained to assist in diagnosis and management of assessed problem(s)] : EKG obtained to assist in diagnosis and management of assessed problem(s)

## 2024-08-30 NOTE — CARDIOLOGY SUMMARY
[de-identified] : 8/30/24 normal sinus rhythm  [de-identified] : 4/26/24   severe LVH, relative asymmetric septal hypertrophy    EF 67% mild DD ,severe Left ventricular outflow tract obstruction  58 mm hg  normal aortic valve opening ,  mild dilated ascending aorta 3.8 cm  mild to moderate MR  mild  TR

## 2024-11-15 ENCOUNTER — APPOINTMENT (OUTPATIENT)
Dept: CARDIOLOGY | Facility: CLINIC | Age: 69
End: 2024-11-15
Payer: MEDICARE

## 2024-11-15 ENCOUNTER — NON-APPOINTMENT (OUTPATIENT)
Age: 69
End: 2024-11-15

## 2024-11-15 VITALS — HEART RATE: 80 BPM | DIASTOLIC BLOOD PRESSURE: 76 MMHG | SYSTOLIC BLOOD PRESSURE: 140 MMHG

## 2024-11-15 VITALS
HEART RATE: 105 BPM | SYSTOLIC BLOOD PRESSURE: 154 MMHG | WEIGHT: 142 LBS | OXYGEN SATURATION: 96 % | DIASTOLIC BLOOD PRESSURE: 74 MMHG | BODY MASS INDEX: 27.88 KG/M2 | HEIGHT: 60 IN

## 2024-11-15 DIAGNOSIS — I10 ESSENTIAL (PRIMARY) HYPERTENSION: ICD-10-CM

## 2024-11-15 DIAGNOSIS — R94.31 ABNORMAL ELECTROCARDIOGRAM [ECG] [EKG]: ICD-10-CM

## 2024-11-15 DIAGNOSIS — I51.7 CARDIOMEGALY: ICD-10-CM

## 2024-11-15 DIAGNOSIS — E78.5 HYPERLIPIDEMIA, UNSPECIFIED: ICD-10-CM

## 2024-11-15 DIAGNOSIS — I34.0 NONRHEUMATIC MITRAL (VALVE) INSUFFICIENCY: ICD-10-CM

## 2024-11-15 DIAGNOSIS — R01.1 CARDIAC MURMUR, UNSPECIFIED: ICD-10-CM

## 2024-11-15 DIAGNOSIS — I77.810 THORACIC AORTIC ECTASIA: ICD-10-CM

## 2024-11-15 PROCEDURE — G2211 COMPLEX E/M VISIT ADD ON: CPT

## 2024-11-15 PROCEDURE — 93000 ELECTROCARDIOGRAM COMPLETE: CPT

## 2024-11-15 PROCEDURE — 99214 OFFICE O/P EST MOD 30 MIN: CPT

## 2025-05-06 NOTE — HISTORY OF PRESENT ILLNESS
[de-identified] : 67 yo female\par Patient with hx of vertigo presents with  for her group home complains that she was hospitalized 8/30/21 for 2 days for severe vertigo. Pt states she was standing getting ready to leave and suddenly felt very dizzy. She sat down and it didn’t resolve. She started vomiting and then went to the ED. States before when she would get dizzy it would last several seconds and gone. Gets these attacks about one a month. Pt has no ear pain, ear drainage, hearing loss, tinnitus, nasal congestion, nasal discharge, epistaxis, sinus infections, facial pain, facial pressure, throat pain, dysphagia or fevers\par \par  [Dizziness] : dizziness [Vertigo] : vertigo [Eustachian Tube Dysfunction] : no eustachian tube dysfunction [Cholesteatoma] : no cholesteatoma [Early Onset Hearing Loss] : no early onset hearing loss [Stroke] : no stroke [Allergic Rhinitis] : no allergic rhinitis [Adenoidectomy] : no adenoidectomy [Allergies] : no allergies [Asthma] : no asthma [Hyperthyroidism] : no hyperthyroidism [Sialadenitis] : no sialadenitis [Hodgkin Disease] : no hodgkin disease [Non-Hodgkin Lymphoma] : no non-hodgkin lymphoma [None] : No risk factors have been identified. [Graves Disease] : no graves disease [Thyroid Cancer] : no thyroid cancer No